# Patient Record
Sex: MALE | Race: WHITE | NOT HISPANIC OR LATINO | URBAN - NONMETROPOLITAN AREA
[De-identification: names, ages, dates, MRNs, and addresses within clinical notes are randomized per-mention and may not be internally consistent; named-entity substitution may affect disease eponyms.]

---

## 2019-07-16 NOTE — PATIENT DISCUSSION
Reassured patient. Maximum Assistance;2 Person Assistance  Stand to sit: Minimal Assistance;2 Person Assistance  Comment: Pt requires vc's for hand placement and breathing technique. Pt demos very slow movements to complete. Pt requries 2 attempts to complete sit to stand from EOB and one attempt from chair. PM: Pt requires Max A x2 to complete sit to stand from chair using RW. Pt demos increase difficulty with hand transition from chair to RW. Pt requires vc's for breathing technique. Ambulation  Ambulation?: Yes  Ambulation 1  Surface: level tile  Device: Rolling Walker  Assistance: 2 Person assistance;Contact guard assistance(Progressing to CGA x1 with SBA x1 for safety. )  Quality of Gait: Wide IDRIS, unsteady, forward flexed posture slowly improved slightly with distance, stands outside RW and leans with forearms onto walker to sit down  Gait Deviations: Decreased step length; Increased IDRIS; Slow Hien;Decreased step height  Distance: 10ft (AM/PM)  Comments: vc's to stay inside RW for safety, vc's for safe hand placement, vc's for upright posture     Balance  Posture: Poor  Sitting - Static: Good  Sitting - Dynamic: Fair;+  Standing - Static: Good;-  Standing - Dynamic: Fair  Comments: standing balance assessed with RW. Other exercises  Other exercises?: Yes  Other exercises 1: Dangle EOB ~15 min, donning socks with sock aide and BLE ex's. Other exercises 2: Sit to Stand x2  Other exercises 3: Static Standing 1x30 sec with unilateral UE support and 1x2sec without BUE support. Other exercises 4: Bed Mobility with log rolling technique, HOB lowered ~25 degrees  Other exercises 5: CP to lower back, and RLE knee. Pt educated on appropriate time frames. Other exercises 7: Standing pregait BLE ex's to ensure safety with amb. Other exercises 8: Standing marches x5 each. with BUE support on RW. Other exercises 9: sitting EOB UE tasks with SBA for sitting balance.    Other exercises 10: Seated BLE ex's with Lime green TB for

## 2019-08-23 ENCOUNTER — IMPORTED ENCOUNTER (OUTPATIENT)
Dept: URBAN - NONMETROPOLITAN AREA CLINIC 1 | Facility: CLINIC | Age: 59
End: 2019-08-23

## 2019-08-23 PROCEDURE — S0621 ROUTINE OPHTHALMOLOGICAL EXA: HCPCS

## 2019-08-23 NOTE — PATIENT DISCUSSION
Compound Hyperopic Astigmatism OU w/Presbyopia-  discussed findings w/patient-  new spectacle Rx issued-  discussed transitions and the pros/cons associated-  monitor yearly or prn Floaters OU -  discussed findings w/patient-  no holes tears or detachments seen today-  reviewed signs/symptoms RT/RD patient to call or come in if changes in vision are noted from today -  monitor yearly or prn; 's Notes: MR 8/23/2019DFE 8/23/2019

## 2020-08-28 ENCOUNTER — IMPORTED ENCOUNTER (OUTPATIENT)
Dept: URBAN - NONMETROPOLITAN AREA CLINIC 1 | Facility: CLINIC | Age: 60
End: 2020-08-28

## 2020-08-28 PROCEDURE — S0621 ROUTINE OPHTHALMOLOGICAL EXA: HCPCS

## 2020-08-28 NOTE — PATIENT DISCUSSION
Compound Hyperopic Astigmatism OU w/Presbyopia-  discussed findings w/patient-  new spectacle Rx issued-  monitor yearly or prn Floaters OU -  discussed findings w/patient-  no holes tears or detachments seen today-  reviewed signs/symptoms RT/RD patient to call or come in if changes in vision are noted from today -  monitor yearly or prn; 's Notes: MR 8/28/2020DFE 8/28/2020

## 2020-10-05 ENCOUNTER — IMPORTED ENCOUNTER (OUTPATIENT)
Dept: URBAN - NONMETROPOLITAN AREA CLINIC 1 | Facility: CLINIC | Age: 60
End: 2020-10-05

## 2020-10-05 NOTE — PATIENT DISCUSSION
Rx Check-  discussed findings w/patient-  new spectacle Rx issued-  continue to monitor yearly or prn; 's Notes: MR 8/28/2020DFE 8/28/2020

## 2021-09-03 NOTE — PATIENT DISCUSSION
*****9/7/21. The patient elected goal for surgery. The patient was informed that with the Basic option and a near vision goal, there is no guarantee of achieving near vision without glasses after surgery. They will most likely need prescription glasses at all focal points. The patient elects Basic OS, goal of -1.75. O.L.*****.

## 2021-09-03 NOTE — PATIENT DISCUSSION
The patient understands that they may need a YAG Capsulotomy within six months of their cataract surgery and may then need an enhancement. The patient understands that it may be a 6 month process to achieve their best vision.

## 2021-09-03 NOTE — PATIENT DISCUSSION
The patient expressed a desire to see through the full range of vision from distance, to middle, to near without glasses. The limitations of advanced lens technology were reviewed and the recommendation was made for the Synergy IOL OU. The patient understands there is no guarantee of glasses free vision and the more complete range of vision from the Synergy lens comes with a trade-off. Side effects include halos around point sources of light at night and failure to adapt in 1 in 500 cases resulting in the need for exchange of the lens. Enhancement, including Lasik, may be necessary to achieve the full uncorrected vision potential. The patient is considering Synergy OS, goal of emmetropia.

## 2021-09-03 NOTE — PATIENT DISCUSSION
The patient was informed that with Custom Vision for near, they will need glasses for all intermediate and distance activities after surgery. The patient understands there is a possibility they may need an enhancement after surgery. The patient is considering Custom Vision OS, goal of -1.75.

## 2021-10-06 NOTE — PATIENT DISCUSSION
No retinal holes, tears, or detachment at this time to limited view, recommend regular dilated fundus examination.

## 2021-10-06 NOTE — PATIENT DISCUSSION
Cataract surgery has been performed in the first eye and activities of daily living are still impaired. The patient would like to proceed with cataract surgery in the second eye as scheduled. The patient elects basic OD, goal of -1.75.

## 2021-10-13 NOTE — PATIENT DISCUSSION
Educated pt on healing, still a bit of temporal corneal edema near incision site. Discussed potential use of Minerva 128, and pt declined. Pt rescheduled second eye for December.

## 2021-10-20 ENCOUNTER — IMPORTED ENCOUNTER (OUTPATIENT)
Dept: URBAN - NONMETROPOLITAN AREA CLINIC 1 | Facility: CLINIC | Age: 61
End: 2021-10-20

## 2021-10-20 PROCEDURE — S0621 ROUTINE OPHTHALMOLOGICAL EXA: HCPCS

## 2021-10-20 NOTE — PATIENT DISCUSSION
Compound Hyperopic Astigmatism OU w/Presbyopia -  discussed findings w/patient -  new spectacle Rx issued -  continue to monitor Cataracts OU -  discussed findings w/patient -  discussed signs and symptoms associated -  recommend UV protection -  no treatment indicated at this time -  continue to monitor Floaters OU -  discussed findings w/patient-  no holes tears or detachments seen today-  reviewed signs/symptoms RT/RD patient to call or come in if changes in vision are noted from today -  monitor yearly or prn; 's Notes: MR 10/20/2021DFE 10/20/2021

## 2021-12-07 NOTE — PATIENT DISCUSSION
Dilated pt and found no retinal tears, holes, or detachments. Mac OCT shows no CME or changes OS. Recommend AMARILYS tx,  warm compresses and PFATs. Pt got new glasses today, suspect ghosting is due to astig OS.

## 2022-04-09 ASSESSMENT — VISUAL ACUITY
OD_SC: 20/30
OD_SC: 20/20
OD_SC: 20/25+2
OU_CC: 20/20
OU_SC: 20/20
OS_SC: 20/20
OU_CC: 20/20
OS_SC: 20/20
OS_SC: 20/25

## 2022-04-09 ASSESSMENT — TONOMETRY
OD_IOP_MMHG: 15
OD_IOP_MMHG: 15
OS_IOP_MMHG: 16
OS_IOP_MMHG: 16
OD_IOP_MMHG: 16
OS_IOP_MMHG: 15

## 2022-05-18 NOTE — PATIENT DISCUSSION
10/6/21: Cataract surgery has been performed in the first eye and activities of daily living are still impaired. The patient elected basic OD, goal of -1.75.

## 2023-09-11 ENCOUNTER — ESTABLISHED PATIENT (OUTPATIENT)
Dept: RURAL CLINIC 1 | Facility: CLINIC | Age: 63
End: 2023-09-11

## 2023-09-11 DIAGNOSIS — H52.03: ICD-10-CM

## 2023-09-11 PROCEDURE — S0621 ROUTINE OPHTHALMOLOGICAL EXA: HCPCS

## 2023-09-11 ASSESSMENT — VISUAL ACUITY
OS_CC: 20/20-1
OD_CC: 20/20-2

## 2023-09-11 ASSESSMENT — TONOMETRY
OS_IOP_MMHG: 17
OD_IOP_MMHG: 17